# Patient Record
Sex: MALE | Race: ASIAN | NOT HISPANIC OR LATINO
[De-identification: names, ages, dates, MRNs, and addresses within clinical notes are randomized per-mention and may not be internally consistent; named-entity substitution may affect disease eponyms.]

---

## 2022-11-23 PROBLEM — Z00.00 ENCOUNTER FOR PREVENTIVE HEALTH EXAMINATION: Status: ACTIVE | Noted: 2022-11-23

## 2022-12-20 ENCOUNTER — NON-APPOINTMENT (OUTPATIENT)
Age: 57
End: 2022-12-20

## 2022-12-21 ENCOUNTER — APPOINTMENT (OUTPATIENT)
Dept: HEART AND VASCULAR | Facility: CLINIC | Age: 57
End: 2022-12-21
Payer: COMMERCIAL

## 2022-12-21 ENCOUNTER — NON-APPOINTMENT (OUTPATIENT)
Age: 57
End: 2022-12-21

## 2022-12-21 VITALS
TEMPERATURE: 96.5 F | WEIGHT: 225 LBS | HEART RATE: 85 BPM | SYSTOLIC BLOOD PRESSURE: 112 MMHG | HEIGHT: 68 IN | DIASTOLIC BLOOD PRESSURE: 69 MMHG | BODY MASS INDEX: 34.1 KG/M2

## 2022-12-21 DIAGNOSIS — Z78.9 OTHER SPECIFIED HEALTH STATUS: ICD-10-CM

## 2022-12-21 PROCEDURE — 99204 OFFICE O/P NEW MOD 45 MIN: CPT | Mod: 25

## 2022-12-21 PROCEDURE — 93000 ELECTROCARDIOGRAM COMPLETE: CPT

## 2022-12-21 RX ORDER — SIMVASTATIN 20 MG/1
20 TABLET, FILM COATED ORAL
Refills: 0 | Status: ACTIVE | COMMUNITY

## 2022-12-21 RX ORDER — APIXABAN 5 MG/1
5 TABLET, FILM COATED ORAL
Qty: 180 | Refills: 3 | Status: ACTIVE | COMMUNITY
Start: 2022-12-21 | End: 1900-01-01

## 2022-12-21 RX ORDER — AMLODIPINE VALSARTAN AND HYDROCHLOROTHIAZIDE 10; 320; 25 MG/1; MG/1; MG/1
10-320-25 TABLET, FILM COATED ORAL
Refills: 0 | Status: ACTIVE | COMMUNITY

## 2022-12-28 NOTE — ADDENDUM
[FreeTextEntry1] : I, Dominick Ruiz, am scribing for and the presence of Dr. Madera the following sections: HPI, PMH,Family/social history, ROS, Physical Exam, Assessment / Plan.\par

## 2022-12-28 NOTE — HISTORY OF PRESENT ILLNESS
[FreeTextEntry1] : 57 year old male with HTN, HLD and atrial fibrillation, who presents for initial evaluation.\par \par Of note Mr. Tenorio is here with his brother.  He speaks some english but his primary language is [place].  He is here with his brother and Mr. Tenorio refused a phone  and requested that his brother translated.\par \par He states he was first diagnosed with atrial fibrillation about 2 months ago.  He has noted SOB for the past 6-7 months.  States his EKG prior was early 2022 and was in NSR per his recollection. He has palpitations but states that these episodes are better on his current medications.  no syncope, chest pain, SOB at rest, orthopnea or edema.\par \par He is compliant with eliquis\par \par \par TSH WNL\par exho 8/2022- WD 55%. mild MR. LA normal \par holter 8/2022 atrial fibrillation with rates  with avg \par stress EF 55% mild anteroseptal hypokinesis

## 2022-12-28 NOTE — DISCUSSION/SUMMARY
[FreeTextEntry1] : 57 year old male with HTN, HLD and atrial fibrillation, who presents for initial evaluation.  We discussed in detail the normal conduction system of the heart and what atrial fibrillation is.  We discussed the natural progression of this arrhythmia in the context of any existing comorbidities.  We also discussed the association between atrial fibrillation and thrombotic events / stroke.  His CHADS score is at least 1 (HTN) and she is currently on oral anticoagulation.   We discussed lifestyle modifications including weight control, treatment of HTN, and limiting alcohol.  We discussed the association between sleep apnea and if they in fact have sleep apnea the importance of treating this regardless of the treatment plan.\par \par We discussed treatment options for persistent atrial fibrillation including a rate control strategy vs. rhythm control with cardioversion +/- antiarrhythmic medications or an ablation.  The patent was counseled on the fact that there is no cure for atrial fibrillation and that for long term control of atrial fibrillation a combination of strategies if often used.  Regardless of treatment options and maintenance of sinus rhythm, anticoagulation is still recommended based on CHADSVASC score.  \par \par Success in rhythm control management is best defined as improved quality of life, maintenance of sinus rhythm and reduced hospitalization since not all patients have symptoms to diagnose “sub-clinical episodes”.  We discussed that an ablation results in better long-term outcomes compared to medical therapy alone but repeat procedures and/or addition of medications may be required even after an ablation.  \par \par After discussing this the patient would like to proceed with an ablation.  We discussed the procedure in detail including risks, benefits, and drawbacks of each option in detail.   We discussed the procedure in detail including risks, benefits, and alternatives.  I have quoted him a 1:700 chance of major complication related to the procedure.  Risks including, but not limited to; infection, anesthesia reaction, bleeding, pain, vascular injury, cardiac perforation, esophageal injury/fistula,  TE/CVA, phrenic nerve injury, arrhythmia recurrence, need for emergent surgery and death were discussed.  We also discussed that having recurrent arrhythmia for the first 90 days post ablation is not predictive of long-term success of the ablation.  \par \par Mr. Tenorio  appeared to understand the whole discussion and verbalized that all his questions were answered to his satisfaction.  He was given pre procedure instructions and knows to call with any questions or concerns.  \par \par During this visit we reviewed outside medical records including event monitor, echo, MRI, office notes and care plan was discussed with patient / family as well as referring doctor.\par \par  [EKG obtained to assist in diagnosis and management of assessed problem(s)] : EKG obtained to assist in diagnosis and management of assessed problem(s)

## 2022-12-28 NOTE — PHYSICAL EXAM
[Well Developed] : well developed [Well Nourished] : well nourished [No Acute Distress] : no acute distress [Normal Conjunctiva] : normal conjunctiva [5th Left ICS - MCL] : palpated at the 5th LICS in the midclavicular line [Normal Rate] : normal [Irregularly Irregular] : irregularly irregular [Clear Lung Fields] : clear lung fields [Good Air Entry] : good air entry [No Respiratory Distress] : no respiratory distress  [Soft] : abdomen soft [Normal Gait] : normal gait [No Edema] : no edema [Moves all extremities] : moves all extremities [Alert and Oriented] : alert and oriented

## 2022-12-28 NOTE — REVIEW OF SYSTEMS
[Fever] : no fever [Chills] : no chills [Feeling Fatigued] : feeling fatigued [Blurry Vision] : no blurred vision [SOB] : no shortness of breath [Dyspnea on exertion] : dyspnea during exertion [Chest Discomfort] : no chest discomfort [Palpitations] : palpitations [Orthopnea] : no orthopnea [Syncope] : no syncope [Cough] : no cough [Wheezing] : no wheezing [Abdominal Pain] : no abdominal pain [Negative] : Heme/Lymph

## 2023-02-03 ENCOUNTER — INPATIENT (INPATIENT)
Facility: HOSPITAL | Age: 58
LOS: 0 days | Discharge: HOME | DRG: 274 | End: 2023-02-04
Attending: INTERNAL MEDICINE | Admitting: INTERNAL MEDICINE
Payer: COMMERCIAL

## 2023-02-03 VITALS
TEMPERATURE: 97 F | SYSTOLIC BLOOD PRESSURE: 130 MMHG | OXYGEN SATURATION: 98 % | RESPIRATION RATE: 16 BRPM | DIASTOLIC BLOOD PRESSURE: 90 MMHG | HEART RATE: 91 BPM

## 2023-02-03 LAB
ANION GAP SERPL CALC-SCNC: 9 MMOL/L — SIGNIFICANT CHANGE UP (ref 5–17)
APTT BLD: 31.6 SEC — SIGNIFICANT CHANGE UP (ref 27.5–35.5)
BLD GP AB SCN SERPL QL: NEGATIVE — SIGNIFICANT CHANGE UP
BUN SERPL-MCNC: 15 MG/DL — SIGNIFICANT CHANGE UP (ref 7–23)
CALCIUM SERPL-MCNC: 9.7 MG/DL — SIGNIFICANT CHANGE UP (ref 8.4–10.5)
CHLORIDE SERPL-SCNC: 103 MMOL/L — SIGNIFICANT CHANGE UP (ref 96–108)
CO2 SERPL-SCNC: 27 MMOL/L — SIGNIFICANT CHANGE UP (ref 22–31)
CREAT SERPL-MCNC: 1.06 MG/DL — SIGNIFICANT CHANGE UP (ref 0.5–1.3)
EGFR: 81 ML/MIN/1.73M2 — SIGNIFICANT CHANGE UP
GLUCOSE SERPL-MCNC: 88 MG/DL — SIGNIFICANT CHANGE UP (ref 70–99)
HCT VFR BLD CALC: 42.4 % — SIGNIFICANT CHANGE UP (ref 39–50)
HGB BLD-MCNC: 14.8 G/DL — SIGNIFICANT CHANGE UP (ref 13–17)
INR BLD: 1.31 — HIGH (ref 0.88–1.16)
ISTAT INR: 1.4 — HIGH (ref 0.88–1.16)
ISTAT PT: 16.2 SEC — HIGH (ref 10–12.9)
ISTAT VENOUS BE: 2 MMOL/L — SIGNIFICANT CHANGE UP (ref -2–3)
ISTAT VENOUS GLUCOSE: 91 MG/DL — SIGNIFICANT CHANGE UP (ref 70–99)
ISTAT VENOUS HCO3: 28 MMOL/L — SIGNIFICANT CHANGE UP (ref 23–28)
ISTAT VENOUS HEMATOCRIT: 43 % — SIGNIFICANT CHANGE UP (ref 39–50)
ISTAT VENOUS HEMOGLOBIN: 14.6 GM/DL — SIGNIFICANT CHANGE UP (ref 13–17)
ISTAT VENOUS IONIZED CALCIUM: 1.22 MMOL/L — SIGNIFICANT CHANGE UP (ref 1.12–1.3)
ISTAT VENOUS PCO2: 49 MMHG — SIGNIFICANT CHANGE UP (ref 41–51)
ISTAT VENOUS PH: 7.37 — SIGNIFICANT CHANGE UP (ref 7.31–7.41)
ISTAT VENOUS PO2: <66 MMHG — LOW (ref 35–40)
ISTAT VENOUS POTASSIUM: 4.5 MMOL/L — SIGNIFICANT CHANGE UP (ref 3.5–5.3)
ISTAT VENOUS SO2: 40 % — SIGNIFICANT CHANGE UP
ISTAT VENOUS SODIUM: 140 MMOL/L — SIGNIFICANT CHANGE UP (ref 135–145)
ISTAT VENOUS TCO2: 29 MMOL/L — SIGNIFICANT CHANGE UP (ref 22–31)
MCHC RBC-ENTMCNC: 32 PG — SIGNIFICANT CHANGE UP (ref 27–34)
MCHC RBC-ENTMCNC: 34.9 GM/DL — SIGNIFICANT CHANGE UP (ref 32–36)
MCV RBC AUTO: 91.6 FL — SIGNIFICANT CHANGE UP (ref 80–100)
NRBC # BLD: 0 /100 WBCS — SIGNIFICANT CHANGE UP (ref 0–0)
PLATELET # BLD AUTO: 216 K/UL — SIGNIFICANT CHANGE UP (ref 150–400)
POCT ISTAT CREATININE: 1.1 MG/DL — SIGNIFICANT CHANGE UP (ref 0.5–1.3)
POTASSIUM SERPL-MCNC: 4.4 MMOL/L — SIGNIFICANT CHANGE UP (ref 3.5–5.3)
POTASSIUM SERPL-SCNC: 4.4 MMOL/L — SIGNIFICANT CHANGE UP (ref 3.5–5.3)
PROTHROM AB SERPL-ACNC: 15.6 SEC — HIGH (ref 10.5–13.4)
RBC # BLD: 4.63 M/UL — SIGNIFICANT CHANGE UP (ref 4.2–5.8)
RBC # FLD: 11.5 % — SIGNIFICANT CHANGE UP (ref 10.3–14.5)
RH IG SCN BLD-IMP: POSITIVE — SIGNIFICANT CHANGE UP
RH IG SCN BLD-IMP: POSITIVE — SIGNIFICANT CHANGE UP
SODIUM SERPL-SCNC: 139 MMOL/L — SIGNIFICANT CHANGE UP (ref 135–145)
WBC # BLD: 8.57 K/UL — SIGNIFICANT CHANGE UP (ref 3.8–10.5)
WBC # FLD AUTO: 8.57 K/UL — SIGNIFICANT CHANGE UP (ref 3.8–10.5)

## 2023-02-03 PROCEDURE — 93656 COMPRE EP EVAL ABLTJ ATR FIB: CPT

## 2023-02-03 RX ORDER — FUROSEMIDE 40 MG
20 TABLET ORAL DAILY
Refills: 0 | Status: DISCONTINUED | OUTPATIENT
Start: 2023-02-04 | End: 2023-02-04

## 2023-02-03 RX ORDER — FLECAINIDE ACETATE 50 MG
100 TABLET ORAL
Refills: 0 | Status: DISCONTINUED | OUTPATIENT
Start: 2023-02-03 | End: 2023-02-04

## 2023-02-03 RX ORDER — SIMVASTATIN 20 MG/1
20 TABLET, FILM COATED ORAL AT BEDTIME
Refills: 0 | Status: DISCONTINUED | OUTPATIENT
Start: 2023-02-03 | End: 2023-02-04

## 2023-02-03 RX ORDER — AMLODIPINE BESYLATE 2.5 MG/1
10 TABLET ORAL DAILY
Refills: 0 | Status: DISCONTINUED | OUTPATIENT
Start: 2023-02-03 | End: 2023-02-04

## 2023-02-03 RX ORDER — METOPROLOL TARTRATE 50 MG
200 TABLET ORAL DAILY
Refills: 0 | Status: DISCONTINUED | OUTPATIENT
Start: 2023-02-03 | End: 2023-02-04

## 2023-02-03 RX ORDER — PANTOPRAZOLE SODIUM 20 MG/1
40 TABLET, DELAYED RELEASE ORAL
Refills: 0 | Status: DISCONTINUED | OUTPATIENT
Start: 2023-02-03 | End: 2023-02-04

## 2023-02-03 RX ORDER — APIXABAN 2.5 MG/1
5 TABLET, FILM COATED ORAL
Refills: 0 | Status: DISCONTINUED | OUTPATIENT
Start: 2023-02-03 | End: 2023-02-04

## 2023-02-03 RX ORDER — VALSARTAN 80 MG/1
320 TABLET ORAL DAILY
Refills: 0 | Status: DISCONTINUED | OUTPATIENT
Start: 2023-02-03 | End: 2023-02-04

## 2023-02-03 RX ADMIN — SIMVASTATIN 20 MILLIGRAM(S): 20 TABLET, FILM COATED ORAL at 22:03

## 2023-02-03 RX ADMIN — APIXABAN 5 MILLIGRAM(S): 2.5 TABLET, FILM COATED ORAL at 22:03

## 2023-02-03 RX ADMIN — Medication 100 MILLIGRAM(S): at 22:03

## 2023-02-03 NOTE — H&P ADULT - ASSESSMENT
57 year old male with HTN, HLD and atrial fibrillation (on eliquis) here for elective AFIB ablation. Normal LVEF on Echo and stress test.    - NPO for AFIB PVI ablation  - Bedrest post procedure per protocol  - Continue A/C post procedure (6 hours post procedure)   - Continue home meds.

## 2023-02-03 NOTE — H&P ADULT - HISTORY OF PRESENT ILLNESS
57 year old male with HTN, HLD and atrial fibrillation (on eliquis) here for elective AFIB ablation.     He states he was first diagnosed with atrial fibrillation about 4 months ago.  He has noted SOB for the past 6-7 months.  States his EKG prior was early 2022 and was in NSR per his recollection. He has palpitations but states that these episodes are better on his current medications.  no syncope, chest pain, SOB at rest, orthopnea or edema.  He is compliant with eliquis.   TSH WNL  echo 8/2022- WD 55%. mild MR. LA normal   holter 8/2022 atrial fibrillation with rates  with avg   stress EF 55% mild anteroseptal hypokinesis

## 2023-02-03 NOTE — PRE-ANESTHESIA EVALUATION ADULT - NSANTHOSAYNRD_GEN_A_CORE
No. EMY screening performed.  STOP BANG Legend: 0-2 = LOW Risk; 3-4 = INTERMEDIATE Risk; 5-8 = HIGH Risk

## 2023-02-03 NOTE — CHART NOTE - NSCHARTNOTEFT_GEN_A_CORE
BRIEF PROCEDURE NOTE    NINO ELLISON  [id number]    Pre-op Diagnosis: Atrial fibrillation    Post-op Diagnosis: Same    Procedure: Atrial Fibrillation Ablation    Electrophysiologist: Dean Madera MD    Assistant: Bob Payne MD - fellow    Anesthesia: General Anesthesia    Access: R and L Femoral veins    Description:  7Fr sheath LFV: decapolar catheter in CS  11Fr sheath LFV: ICE  8Fr sheath RFV: Transeptal, Mapping/ablation    ICE guided transeptal  Mapping/3D shell created of left atrium and PVs  esophogeal temp monitoring throughout  PVI, isolation confirmed with pacing, adenosine with evidence of increased PV activity with confirmed exit block        Complications: none    EBL: 20cc    Disposition: to recovery, overnight observation    Plan:  -resume Eliquis tonight  -Lasix 20mg daily for 5 days  -colchicine 0.6mg daily for 7 days  -pantoprazole 40mg daily for 1 month  -start flecainide 100mg bid, overnight telemetry monitoring  -resume home Toprol XL  -bedrest 3 hours ~(10pm)    Bob Payne MD  EP Fellow

## 2023-02-04 ENCOUNTER — TRANSCRIPTION ENCOUNTER (OUTPATIENT)
Age: 58
End: 2023-02-04

## 2023-02-04 VITALS — TEMPERATURE: 98 F

## 2023-02-04 DIAGNOSIS — E78.5 HYPERLIPIDEMIA, UNSPECIFIED: ICD-10-CM

## 2023-02-04 PROCEDURE — C1759: CPT

## 2023-02-04 PROCEDURE — 82803 BLOOD GASES ANY COMBINATION: CPT

## 2023-02-04 PROCEDURE — 82947 ASSAY GLUCOSE BLOOD QUANT: CPT

## 2023-02-04 PROCEDURE — C1730: CPT

## 2023-02-04 PROCEDURE — 82330 ASSAY OF CALCIUM: CPT

## 2023-02-04 PROCEDURE — 86850 RBC ANTIBODY SCREEN: CPT

## 2023-02-04 PROCEDURE — C1766: CPT

## 2023-02-04 PROCEDURE — 80048 BASIC METABOLIC PNL TOTAL CA: CPT

## 2023-02-04 PROCEDURE — 85027 COMPLETE CBC AUTOMATED: CPT

## 2023-02-04 PROCEDURE — 99238 HOSP IP/OBS DSCHRG MGMT 30/<: CPT

## 2023-02-04 PROCEDURE — 85347 COAGULATION TIME ACTIVATED: CPT

## 2023-02-04 PROCEDURE — C1732: CPT

## 2023-02-04 PROCEDURE — C1760: CPT

## 2023-02-04 PROCEDURE — 36415 COLL VENOUS BLD VENIPUNCTURE: CPT

## 2023-02-04 PROCEDURE — 85730 THROMBOPLASTIN TIME PARTIAL: CPT

## 2023-02-04 PROCEDURE — 84295 ASSAY OF SERUM SODIUM: CPT

## 2023-02-04 PROCEDURE — C1894: CPT

## 2023-02-04 PROCEDURE — 86900 BLOOD TYPING SEROLOGIC ABO: CPT

## 2023-02-04 PROCEDURE — 86901 BLOOD TYPING SEROLOGIC RH(D): CPT

## 2023-02-04 PROCEDURE — 85014 HEMATOCRIT: CPT

## 2023-02-04 PROCEDURE — 84132 ASSAY OF SERUM POTASSIUM: CPT

## 2023-02-04 PROCEDURE — 85610 PROTHROMBIN TIME: CPT

## 2023-02-04 PROCEDURE — 82565 ASSAY OF CREATININE: CPT

## 2023-02-04 RX ORDER — APIXABAN 2.5 MG/1
1 TABLET, FILM COATED ORAL
Qty: 0 | Refills: 0 | DISCHARGE

## 2023-02-04 RX ORDER — APIXABAN 2.5 MG/1
1 TABLET, FILM COATED ORAL
Qty: 60 | Refills: 3
Start: 2023-02-04 | End: 2023-06-03

## 2023-02-04 RX ORDER — PANTOPRAZOLE SODIUM 20 MG/1
1 TABLET, DELAYED RELEASE ORAL
Qty: 30 | Refills: 0
Start: 2023-02-04 | End: 2023-03-05

## 2023-02-04 RX ORDER — FLECAINIDE ACETATE 50 MG
1 TABLET ORAL
Qty: 60 | Refills: 3
Start: 2023-02-04 | End: 2023-06-03

## 2023-02-04 RX ORDER — METOPROLOL TARTRATE 50 MG
1 TABLET ORAL
Qty: 30 | Refills: 3
Start: 2023-02-04 | End: 2023-06-03

## 2023-02-04 RX ORDER — SIMVASTATIN 20 MG/1
1 TABLET, FILM COATED ORAL
Qty: 30 | Refills: 3
Start: 2023-02-04 | End: 2023-06-03

## 2023-02-04 RX ORDER — SIMVASTATIN 20 MG/1
1 TABLET, FILM COATED ORAL
Qty: 0 | Refills: 0 | DISCHARGE

## 2023-02-04 RX ORDER — METOPROLOL TARTRATE 50 MG
1 TABLET ORAL
Qty: 0 | Refills: 0 | DISCHARGE

## 2023-02-04 RX ORDER — FUROSEMIDE 40 MG
1 TABLET ORAL
Qty: 4 | Refills: 0
Start: 2023-02-04 | End: 2023-02-07

## 2023-02-04 RX ORDER — AMLODIPINE VALSARTAN AND HYDROCHLOROTHIAZIDE 10; 160; 25 MG/1; MG/1; MG/1
1 TABLET, FILM COATED ORAL
Qty: 30 | Refills: 3
Start: 2023-02-04 | End: 2023-06-03

## 2023-02-04 RX ORDER — AMLODIPINE VALSARTAN AND HYDROCHLOROTHIAZIDE 10; 160; 25 MG/1; MG/1; MG/1
1 TABLET, FILM COATED ORAL
Qty: 0 | Refills: 0 | DISCHARGE

## 2023-02-04 RX ADMIN — AMLODIPINE BESYLATE 10 MILLIGRAM(S): 2.5 TABLET ORAL at 06:58

## 2023-02-04 RX ADMIN — VALSARTAN 320 MILLIGRAM(S): 80 TABLET ORAL at 06:58

## 2023-02-04 RX ADMIN — Medication 100 MILLIGRAM(S): at 09:53

## 2023-02-04 RX ADMIN — APIXABAN 5 MILLIGRAM(S): 2.5 TABLET, FILM COATED ORAL at 06:58

## 2023-02-04 RX ADMIN — PANTOPRAZOLE SODIUM 40 MILLIGRAM(S): 20 TABLET, DELAYED RELEASE ORAL at 06:58

## 2023-02-04 RX ADMIN — Medication 20 MILLIGRAM(S): at 07:34

## 2023-02-04 RX ADMIN — Medication 200 MILLIGRAM(S): at 06:59

## 2023-02-04 NOTE — PROGRESS NOTE ADULT - ASSESSMENT
57 year old male with HTN, HLD and atrial fibrillation who underwent AF ablation yesterday.    -ok for discharge from EP standpoint  -activity restrictions reviewed  -continue eliquis and metoprolol.  flecainide 100mg bid for one month.  -furosemide for 4 more days.  -outpatient follow-up with Dr. Madera.

## 2023-02-04 NOTE — DISCHARGE NOTE NURSING/CASE MANAGEMENT/SOCIAL WORK - NURSING SECTION COMPLETE
Patient/Caregiver provided printed discharge information. V-Y Flap Text: The defect edges were debeveled with a #15 scalpel blade.  Given the location of the defect, shape of the defect and the proximity to free margins a V-Y flap was deemed most appropriate.  Using a sterile surgical marker, an appropriate advancement flap was drawn incorporating the defect and placing the expected incisions within the relaxed skin tension lines where possible.    The area thus outlined was incised deep to adipose tissue with a #15 scalpel blade.  The skin margins were undermined to an appropriate distance in all directions utilizing iris scissors.

## 2023-02-04 NOTE — DISCHARGE NOTE PROVIDER - NSDCCPCAREPLAN_GEN_ALL_CORE_FT
PRINCIPAL DISCHARGE DIAGNOSIS  Diagnosis: Other persistent atrial fibrillation  Assessment and Plan of Treatment: You have a history Atrial Fbrillation (a type of irregular heartbeat)and underwent an Ablation procedure on 2/3/23  --PLEASE CONTINUE YOUR HOME ELIQUIS 5MG TWICE DAILY AS PRESCRIBED (NOT NEW)  --PLEASE CONTINUE YOUR HOME METORPOLOL SUCCINATE 200MG DAILY (NOT NEW)  --YOU WERE ALSO STARTED ON FLECAINIDE 100MG, EVERY 12 HOURS FOR RHYTHM CONTROL (NEW)  --PLEASE TAKE LASIX 20MG DAILY FOR 4 DAYS TO PREVENT FLUID BUILD UP (NEW)  --PLEASE TAKE PANTOPRAZOLE 40MG DAILY FOR ONE MONTH FOR STOMACH PROTECTION  --Please follow up with Dr Madera in 2 weeks for post discharge check-up.          SECONDARY DISCHARGE DIAGNOSES  Diagnosis: HTN (hypertension)  Assessment and Plan of Treatment: You have a history of elevated blood pressure and you should continue your blood pressure medications as prescribed.  --PLEASE CONTINUE YOUR HOME EXFORGE HCT 10/320/25MG DAILY (NOT NEW)       PRINCIPAL DISCHARGE DIAGNOSIS  Diagnosis: Other persistent atrial fibrillation  Assessment and Plan of Treatment: You have a history Atrial Fbrillation (a type of irregular heartbeat)and underwent an Ablation procedure on 2/3/23  --PLEASE CONTINUE YOUR HOME ELIQUIS 5MG TWICE DAILY AS PRESCRIBED (NOT NEW)  --PLEASE CONTINUE YOUR HOME METORPOLOL SUCCINATE 200MG DAILY (NOT NEW)  --YOU WERE ALSO STARTED ON FLECAINIDE 100MG, EVERY 12 HOURS FOR RHYTHM CONTROL (NEW)  --PLEASE TAKE LASIX 20MG DAILY FOR 4 DAYS TO PREVENT FLUID BUILD UP (NEW)  --PLEASE TAKE PANTOPRAZOLE 40MG DAILY FOR ONE MONTH FOR STOMACH PROTECTION  --Please follow up with Dr Madera in 2 weeks for post discharge check-up.          SECONDARY DISCHARGE DIAGNOSES  Diagnosis: HTN (hypertension)  Assessment and Plan of Treatment: You have a history of elevated blood pressure and you should continue your blood pressure medications as prescribed.  --PLEASE CONTINUE YOUR HOME EXFORGE HCT 10/320/25MG DAILY (NOT NEW)  --C/W METOPROLOL SUCCINATE 20MG DAILY(NOT NEW)

## 2023-02-04 NOTE — DISCHARGE NOTE PROVIDER - CARE PROVIDER_API CALL
Dean Madera)  Cardiac Electrophysiology  [address], [address] [place] [address]  Phone: [phone]  Fax: [phone]  Follow Up Time:

## 2023-02-04 NOTE — DISCHARGE NOTE NURSING/CASE MANAGEMENT/SOCIAL WORK - PATIENT PORTAL LINK FT
You can access the FollowMyHealth Patient Portal offered by [facility] by registering at the following website: [email or web address]. By joining [facility]’s FollowMyHealth portal, you will also be able to view your health information using other applications (apps) compatible with our system.

## 2023-02-04 NOTE — PATIENT PROFILE ADULT - FALL HARM RISK - HARM RISK INTERVENTIONS
Assistance with ambulation/Assistance OOB with selected safe patient handling equipment/Communicate Risk of Fall with Harm to all staff/Monitor gait and stability/Reinforce activity limits and safety measures with patient and family/Review medications for side effects contributing to fall risk/Sit up slowly, dangle for a short time, stand at bedside before walking/Tailored Fall Risk Interventions/Toileting schedule using arm’s reach rule for commode and bathroom/Use of alarms - bed, chair and/or voice tab/Visual Cue: Yellow wristband and red socks/Bed in lowest position, wheels locked, appropriate side rails in place/Call bell, personal items and telephone in reach/Instruct patient to call for assistance before getting out of bed or chair/Non-slip footwear when patient is out of bed/[place] to call system/Physically safe environment - no spills, clutter or unnecessary equipment/Purposeful Proactive Rounding/Room/bathroom lighting operational, light cord in reach

## 2023-02-04 NOTE — DISCHARGE NOTE PROVIDER - HOSPITAL COURSE
57 year old male [place] / english with HTN, HLD and atrial fibrillation (on eliquis) here for elective AFIB ablation.     He states he was first diagnosed with atrial fibrillation about 4 months ago.  He has noted SOB for the past 6-7 months.  States his EKG prior was early 2022 and was in NSR per his recollection. He has palpitations but states that these episodes are better on his current medications.  no syncope, chest pain, SOB at rest, orthopnea or edema.  He is compliant with eliquis.   TSH WNL  echo 8/2022- WD 55%. mild MR. LA normal   holter 8/2022 atrial fibrillation with rates  with avg   stress EF 55% mild anteroseptal hypokinesis    - s/p AFIB ablation (B/L 2 L CFV, 1 R CFV manually pulled)  -Start Flecainide 100mg BID, EKG 2h post dose  -Lasix 20mg PO daily x 5 days to start in AM  -Pantoprazole 40mg PO QAM x 1 month  -Eliquis to be resumed tonight 2/3 PM.    2/3 O/N: EKG stable, .   57 year old male [place] / english with HTN, HLD and atrial fibrillation (on eliquis) here for elective AFIB ablation.  He states he was first diagnosed with atrial fibrillation about 4 months ago, noted with SOB for the past 6-7 months.  States his EKG prior was early 2022 and was in NSR per his recollection. Pt with palpitations but states that these episodes are better on his current medications.  no syncope, chest pain, SOB at rest, orthopnea or edema.  He is compliant with Eliquis.   TSH WNL.  ECHO 8/2022- WD 55%. mild MR. LA normal.  Holter 8/2022 atrial fibrillation with rates  with avg .  NST EF 55% mild anteroseptal hypokinesis    Pt is s/p AFIB ablation (B/L 2 L CFV, 1 R CFV manually pulled). He was started on Flecainide 100mg BID, EKG 2h post dose, -Lasix 20mg PO daily x 5 days to start on 2/4/23 in AM, Pantoprazole 40mg PO QAM x 1 month  Pt home Eliquis was resumed 2/3/23 in evening post procedure.  EKG stable,  overnight on 2/3/23    Pt seen at bedside today, examined found NAD, HD stable, denies any complaints of CP, SOB or palpitations at this time.  VSS.  EKG  NSR @ *** QTc **********.  Tele Monitor reviewed.  Pt is deemed stable for discharge per Dr Schwartz with follow up in 1-2 weeks with NESSA  for post discharge check-up.  Rx sent to pt preferred pharmacy.  Discharge plans and instruction discussed with pt who is agreeable with plan.  Pt is advised to return to the nearest ED if worsening symptoms of CP, SOB or palpitations or severe bleeding from access site occurs.   57 year old male [place] / english with HTN, HLD and atrial fibrillation (on eliquis) here for elective AFIB ablation.  He states he was first diagnosed with atrial fibrillation about 4 months ago, noted with SOB for the past 6-7 months.  States his EKG prior was early 2022 and was in NSR per his recollection. Pt with palpitations but states that these episodes are better on his current medications.  no syncope, chest pain, SOB at rest, orthopnea or edema.  He is compliant with Eliquis.   TSH WNL.  ECHO 8/2022- WD 55%. mild MR. LA normal.  Holter 8/2022 atrial fibrillation with rates  with avg .  NST EF 55% mild anteroseptal hypokinesis    Pt is s/p AFIB ablation (B/L 2 L CFV, 1 R CFV manually pulled). He was started on Flecainide 100mg BID,  Lasix 20mg PO daily x 5 days to start on 2/4/23 in AM, Pantoprazole 40mg PO QAM x 1 month  Pt home Eliquis was resumed 2/3/23 in evening post procedure.  EKG stable,  overnight on 2/3/23    Pt seen at bedside today, examined found NAD, HD stable, denies any complaints of CP, SOB or palpitations at this time.  VSS.  EKG  2/4/23:  NSR @ 67bpm, QTc 443.  Tele Monitor reviewed.  Pt is deemed stable for discharge per Dr Schwartz with follow up in 2 weeks with Dr Madera  for post discharge check-up.  Rx sent to pt preferred pharmacy.  Discharge plans and instruction discussed with pt who is agreeable with plan.  Pt is advised to return to the nearest ED if worsening symptoms of CP, SOB or palpitations or severe bleeding from access site occurs.   2.12

## 2023-02-04 NOTE — DISCHARGE NOTE PROVIDER - NSDCMRMEDTOKEN_GEN_ALL_CORE_FT
Eliquis 5 mg oral tablet: 1 tab(s) orally 2 times a day  Exforge HCT 10 mg-320 mg-25 mg oral tablet: 1 tab(s) orally once a day  Toprol- mg oral tablet, extended release: 1 tab(s) orally once a day  Zocor 20 mg oral tablet: 1 tab(s) orally once a day (at bedtime)   Eliquis 5 mg oral tablet: 1 tab(s) orally 2 times a day  Exforge HCT 10 mg-320 mg-25 mg oral tablet: 1 tab(s) orally once a day  flecainide 100 mg oral tablet: 1 tab(s) orally 2 times a day  furosemide 20 mg oral tablet: 1 tab(s) orally once a day  pantoprazole 40 mg oral delayed release tablet: 1 tab(s) orally once a day (before a meal)  Toprol- mg oral tablet, extended release: 1 tab(s) orally once a day  Zocor 20 mg oral tablet: 1 tab(s) orally once a day (at bedtime)

## 2023-02-04 NOTE — DISCHARGE NOTE PROVIDER - PROVIDER TOKENS
Bedside and Verbal shift change report given to BUZZ Selby RN (oncoming nurse) by Nidia Koyanagi RN (offgoing nurse). Report included the following information SBAR, Kardex, Intake/Output, MAR and Recent Results. PROVIDER:[TOKEN:[[id number]]]

## 2023-02-04 NOTE — PROGRESS NOTE ADULT - SUBJECTIVE AND OBJECTIVE BOX
No complaints.  Reports he has been urinating a lot.  Telemetry shows sinus rhythm 60s to 70s    MEDICATIONS  (STANDING):  amLODIPine   Tablet 10 milliGRAM(s) Oral daily  apixaban 5 milliGRAM(s) Oral two times a day  flecainide 100 milliGRAM(s) Oral two times a day  furosemide    Tablet 20 milliGRAM(s) Oral daily  hydrochlorothiazide 25 milliGRAM(s) Oral daily  metoprolol succinate  milliGRAM(s) Oral daily  pantoprazole    Tablet 40 milliGRAM(s) Oral before breakfast  simvastatin 20 milliGRAM(s) Oral at bedtime  valsartan 320 milliGRAM(s) Oral daily      PHYSICAL EXAM:  VITAL SIGNS:  T(F): 97.2 (02-04-23 @ 09:45)  HR: 76 (02-04-23 @ 08:29)  BP: 127/76 (02-04-23 @ 08:29)  RR: 18 (02-04-23 @ 08:29)  SpO2: 97% (02-04-23 @ 08:29)    -nad, alert  -no carotid bruit  -regular, no murmurs  -lungs clear bilaterally  -abd soft, nt  -no sig edema.  Femoral access sites c/d/i, no hematoma.  -alert and conversant  -normal mood and affect  -no rash noted  -moves all extremities      LABS:                        14.8   8.57  )-----------( 216      ( 03 Feb 2023 13:46 )             42.4     02-03    139  |  103  |  15  ----------------------------<  88  4.4   |  27  |  1.06    Ca    9.7      03 Feb 2023 13:46      PT/INR - ( 03 Feb 2023 13:46 )   PT: 15.6 sec;   INR: 1.31          PTT - ( 03 Feb 2023 13:46 )  PTT:31.6 sec

## 2023-02-06 PROBLEM — I10 ESSENTIAL (PRIMARY) HYPERTENSION: Chronic | Status: ACTIVE | Noted: 2023-02-03

## 2023-02-06 PROBLEM — I48.0 PAROXYSMAL ATRIAL FIBRILLATION: Chronic | Status: ACTIVE | Noted: 2023-02-03

## 2023-02-06 PROBLEM — E66.9 OBESITY, UNSPECIFIED: Chronic | Status: ACTIVE | Noted: 2023-02-03

## 2023-02-07 LAB
ISTAT ACTK (ACTIVATED CLOTTING TIME KAOLIN): 317 SEC — HIGH (ref 74–137)
ISTAT ACTK (ACTIVATED CLOTTING TIME KAOLIN): 341 SEC — HIGH (ref 74–137)
ISTAT ACTK (ACTIVATED CLOTTING TIME KAOLIN): 348 SEC — HIGH (ref 74–137)
ISTAT ACTK (ACTIVATED CLOTTING TIME KAOLIN): 377 SEC — HIGH (ref 74–137)

## 2023-02-14 DIAGNOSIS — I48.19 OTHER PERSISTENT ATRIAL FIBRILLATION: ICD-10-CM

## 2023-02-14 DIAGNOSIS — I10 ESSENTIAL (PRIMARY) HYPERTENSION: ICD-10-CM

## 2023-02-14 DIAGNOSIS — E78.5 HYPERLIPIDEMIA, UNSPECIFIED: ICD-10-CM

## 2023-02-14 DIAGNOSIS — Z79.01 LONG TERM (CURRENT) USE OF ANTICOAGULANTS: ICD-10-CM

## 2023-02-14 DIAGNOSIS — E66.9 OBESITY, UNSPECIFIED: ICD-10-CM

## 2023-02-22 ENCOUNTER — NON-APPOINTMENT (OUTPATIENT)
Age: 58
End: 2023-02-22

## 2023-02-22 ENCOUNTER — APPOINTMENT (OUTPATIENT)
Dept: HEART AND VASCULAR | Facility: CLINIC | Age: 58
End: 2023-02-22
Payer: COMMERCIAL

## 2023-02-22 VITALS
HEART RATE: 68 BPM | HEIGHT: 68 IN | SYSTOLIC BLOOD PRESSURE: 148 MMHG | BODY MASS INDEX: 34.1 KG/M2 | WEIGHT: 225 LBS | DIASTOLIC BLOOD PRESSURE: 69 MMHG

## 2023-02-22 DIAGNOSIS — I48.19 OTHER PERSISTENT ATRIAL FIBRILLATION: ICD-10-CM

## 2023-02-22 PROCEDURE — 93000 ELECTROCARDIOGRAM COMPLETE: CPT

## 2023-02-22 PROCEDURE — 99214 OFFICE O/P EST MOD 30 MIN: CPT | Mod: 25

## 2023-02-22 NOTE — PHYSICAL EXAM
[Well Developed] : well developed [Well Nourished] : well nourished [No Acute Distress] : no acute distress [Normal Conjunctiva] : normal conjunctiva [5th Left ICS - MCL] : palpated at the 5th LICS in the midclavicular line [Normal Rate] : normal [Irregularly Irregular] : irregularly irregular [Clear Lung Fields] : clear lung fields [Good Air Entry] : good air entry [No Respiratory Distress] : no respiratory distress  [Soft] : abdomen soft [Normal Gait] : normal gait [No Edema] : no edema [Moves all extremities] : moves all extremities [Alert and Oriented] : alert and oriented [Normal Venous Pressure] : normal venous pressure [No Carotid Bruit] : no carotid bruit [Normal S1, S2] : normal S1, S2 [No Murmur] : no murmur [No Rub] : no rub [No Gallop] : no gallop [Non Tender] : non-tender [No Masses/organomegaly] : no masses/organomegaly [Normal Bowel Sounds] : normal bowel sounds [No Cyanosis] : no cyanosis [No Clubbing] : no clubbing [No Varicosities] : no varicosities [No Rash] : no rash [No Skin Lesions] : no skin lesions [No Focal Deficits] : no focal deficits [Normal Speech] : normal speech [Normal memory] : normal memory

## 2023-02-28 NOTE — HISTORY OF PRESENT ILLNESS
[FreeTextEntry1] : 57 year old male with HTN, HLD and atrial fibrillation, who presents for followup evaluation s/p AF ablation 2/3/23. \par \par Of note Mr. Tenorio is usually accompanied by his brother.  He speaks some english but his primary language is [place]. He states he was first diagnosed with atrial fibrillation about 2 months ago.  He has noted SOB for the past 6-7 months.  States his EKG prior was early 2022 and was in NSR per his recollection. \par \par \par Patient states that overall he feels marked improvement. No further episodes of palpitations, no syncope, chest pain, SOB at rest, orthopnea or edema. Patient reports no issues post ablation with access site or with swallowing. \par \par He is compliant with eliquis. He presents today in NSR. \par \par \par TSH WNL\par exho 8/2022- WD 55%. mild MR. LA normal \par holter 8/2022 atrial fibrillation with rates  with avg \par stress EF 55% mild anteroseptal hypokinesis

## 2023-02-28 NOTE — REVIEW OF SYSTEMS
[SOB] : shortness of breath [Negative] : Musculoskeletal [Fever] : no fever [Chills] : no chills [Feeling Fatigued] : not feeling fatigued [Blurry Vision] : no blurred vision [Dyspnea on exertion] : not dyspnea during exertion [Chest Discomfort] : no chest discomfort [Palpitations] : no palpitations [Orthopnea] : no orthopnea [Syncope] : no syncope [Cough] : no cough [Wheezing] : no wheezing [Abdominal Pain] : no abdominal pain

## 2023-02-28 NOTE — ADDENDUM
[FreeTextEntry1] : I, Teresa Mcdaniel, am scribing for and the presence of Dr. Madera the following sections HISTORY OF PRESENT ILLNESSS, CARDIOLOGY SUMMARY, ACTIVE PROBLEMS, PAST MEDICAL/FAMILY/SOCIAL HISTORY; REVIEW OF SYSTEMS; VITAL SIGNS; PHYSICAL EXAM, PROCEDURE, DISCUSSION\par  IDean, personally performed the services described in the documentation, reviewed the documentation recorded by the scribe in my presence and it accurately and completely records my words and actions.\par

## 2023-02-28 NOTE — DISCUSSION/SUMMARY
[EKG obtained to assist in diagnosis and management of assessed problem(s)] : EKG obtained to assist in diagnosis and management of assessed problem(s) [FreeTextEntry1] : 57 year old male with HTN, HLD and atrial fibrillation, who presents for followup evaluation s/p AF ablation 2/3/23. \par \par #Atrial Fibrillation\par No further symptoms\par Patient is in NSR \par No problems with completing normal activity \par No post-procedure complications\par Patient is compliant with anticoagulation \par \par \par #Shortness of breath\par Reports dyspnea on exertion\par Will order lasix to help reduce fluid volume overload\par \par #EMY\par Recommend sleep study evaluation \par Will followup when brother is back in 3 weeks\par \par #Lifestyle modification\par Discussed lifestyle modifications including weight control, treatment of HTN, and limiting alcohol. \par \par Patient will return to office in 2 months, he knows to call with any questions or concerns.

## 2023-04-03 ENCOUNTER — OUTPATIENT (OUTPATIENT)
Dept: OUTPATIENT SERVICES | Facility: HOSPITAL | Age: 58
LOS: 1 days | End: 2023-04-03
Payer: COMMERCIAL

## 2023-04-03 ENCOUNTER — APPOINTMENT (OUTPATIENT)
Dept: SLEEP CENTER | Facility: HOME HEALTH | Age: 58
End: 2023-04-03
Payer: COMMERCIAL

## 2023-04-03 PROCEDURE — 95800 SLP STDY UNATTENDED: CPT

## 2023-04-03 PROCEDURE — 95800 SLP STDY UNATTENDED: CPT | Mod: 26

## 2023-04-04 DIAGNOSIS — G47.33 OBSTRUCTIVE SLEEP APNEA (ADULT) (PEDIATRIC): ICD-10-CM

## 2023-04-19 ENCOUNTER — APPOINTMENT (OUTPATIENT)
Dept: HEART AND VASCULAR | Facility: CLINIC | Age: 58
End: 2023-04-19
Payer: COMMERCIAL

## 2023-04-19 ENCOUNTER — NON-APPOINTMENT (OUTPATIENT)
Age: 58
End: 2023-04-19

## 2023-04-19 VITALS
HEART RATE: 63 BPM | HEIGHT: 68 IN | DIASTOLIC BLOOD PRESSURE: 72 MMHG | BODY MASS INDEX: 35.77 KG/M2 | SYSTOLIC BLOOD PRESSURE: 121 MMHG | WEIGHT: 236 LBS

## 2023-04-19 PROCEDURE — 99214 OFFICE O/P EST MOD 30 MIN: CPT | Mod: 25

## 2023-04-19 PROCEDURE — 93000 ELECTROCARDIOGRAM COMPLETE: CPT

## 2023-04-19 RX ORDER — FUROSEMIDE 20 MG/1
20 TABLET ORAL DAILY
Qty: 7 | Refills: 0 | Status: DISCONTINUED | COMMUNITY
Start: 2023-02-22 | End: 2023-04-19

## 2023-04-20 NOTE — DISCUSSION/SUMMARY
[EKG obtained to assist in diagnosis and management of assessed problem(s)] : EKG obtained to assist in diagnosis and management of assessed problem(s) [FreeTextEntry1] : 57 year old male with HTN, HLD and atrial fibrillation, who presents for followup evaluation s/p AF ablation 2/3/23. \par \par #Atrial Fibrillation\par No further symptoms concerning for recurrent arrhythmia\par Patient is in NSR \par Some exertional fatigue.  Will stop Flecainide and half metoprolol to 100mg a day\par No post-procedure complications\par Patient is compliant with anticoagulation \par \par \par \par #EMY\par He was given the number to contact the sleep center to follow up regarding treatment.\par \par #Lifestyle modification\par Discussed lifestyle modifications including weight control, treatment of HTN, and limiting alcohol. \par \par Patient will return to office in 6 months, he knows to call with any questions or concerns.

## 2023-04-20 NOTE — ADDENDUM
[FreeTextEntry1] :  \par  I, Dean Madera, personally performed the services described in the documentation, reviewed the documentation recorded by the scribe in my presence and it accurately and completely records my words and actions.\par I, Dominick Ruiz, am scribing for and the presence of Dr. Madera the following sections: HPI, PMH,Family/social history, ROS, Physical Exam, Assessment / Plan.\par

## 2023-04-20 NOTE — REVIEW OF SYSTEMS
[Negative] : Heme/Lymph [Feeling Fatigued] : feeling fatigued [Fever] : no fever [Chills] : no chills [Blurry Vision] : no blurred vision [SOB] : no shortness of breath [Dyspnea on exertion] : not dyspnea during exertion [Chest Discomfort] : no chest discomfort [Palpitations] : no palpitations [Orthopnea] : no orthopnea [Syncope] : no syncope [Cough] : no cough [Wheezing] : no wheezing [Abdominal Pain] : no abdominal pain

## 2023-04-20 NOTE — CARDIOLOGY SUMMARY
[de-identified] : 12/21 afib with a ventricular rate of 92bpm\par 2/22/23 NSR 68bpm\par 4/19/23 sinus at 60bpm

## 2023-04-20 NOTE — HISTORY OF PRESENT ILLNESS
[FreeTextEntry1] : 57 year old male with HTN, HLD and atrial fibrillation, who presents for followup evaluation s/p AF ablation 2/3/23. \par \par Of note Mr. Tenorio is usually accompanied by his brother.  He speaks some english but his primary language is [place]. He states he was first diagnosed with atrial fibrillation about 2 months ago.  He has noted SOB for the past 6-7 months.  States his EKG prior was early 2022 and was in NSR per his recollection. \par \par \par Patient states that overall he feels marked improvement. No further episodes of palpitations, no syncope, chest pain, SOB at rest, orthopnea or edema. Patient reports no issues post ablation with access site or with swallowing. He is on flecainide and compliant with eliquis. Sleep study showed moderate sleep apnea and recommended treatment.  He notes some exertional fatigue.\par  \par \par \par TSH WNL\par exho 8/2022- WD 55%. mild MR. LA normal \par holter 8/2022 atrial fibrillation with rates  with avg \par stress EF 55% mild anteroseptal hypokinesis \par

## 2023-06-20 ENCOUNTER — APPOINTMENT (OUTPATIENT)
Dept: PULMONOLOGY | Facility: CLINIC | Age: 58
End: 2023-06-20
Payer: COMMERCIAL

## 2023-06-20 VITALS
BODY MASS INDEX: 35.31 KG/M2 | HEIGHT: 68 IN | OXYGEN SATURATION: 97 % | TEMPERATURE: 97.9 F | WEIGHT: 233 LBS | HEART RATE: 88 BPM | DIASTOLIC BLOOD PRESSURE: 82 MMHG | RESPIRATION RATE: 12 BRPM | SYSTOLIC BLOOD PRESSURE: 151 MMHG

## 2023-06-20 DIAGNOSIS — G47.33 OBSTRUCTIVE SLEEP APNEA (ADULT) (PEDIATRIC): ICD-10-CM

## 2023-06-20 PROCEDURE — 99203 OFFICE O/P NEW LOW 30 MIN: CPT

## 2023-06-21 PROBLEM — G47.33 OBSTRUCTIVE SLEEP APNEA: Status: ACTIVE | Noted: 2023-06-21

## 2023-06-21 NOTE — CONSULT LETTER
[FreeTextEntry3] : Rozina Mccoy MD\par \par [employer] [facility]\par Pulmonary, Critical Care, and Sleep Medicine\par

## 2023-06-21 NOTE — ASSESSMENT
[FreeTextEntry1] : REVIEWED\par HST 4/2023: AHI 20.5 4%; AHI 29.1 3%; T88 2%\par \par 59yo with Afib and moderate EMY. Will start with APAP, set 5 to 20 cm H2O with a home mask fitting. DME is [facility]. The benefits of EMY treatment in improving cardiac, neurologic, cognitive, and mortality outcomes were discussed. Adherence goal is at least 4 hours per night on 70% of nights with an AHI of less than 10 events per hour. The ramifications of EMY and its treatment were discussed in detail. Follow up within 6 weeks of use.\par

## 2023-06-21 NOTE — REVIEW OF SYSTEMS
[Difficulty Initiating Sleep] : no difficulty falling asleep [Lower Extremity Discomfort] : no lower extremity discomfort [Late day/ Evening symptoms] : no late day/evening symptoms

## 2023-06-21 NOTE — HISTORY OF PRESENT ILLNESS
[FreeTextEntry1] : 57 yo with Afib. S/p ablation in Feb 2023. Doing well after ablation. Had HST and diagnosed with EMY; here for management.  [ESS] : 9

## 2023-07-19 ENCOUNTER — APPOINTMENT (OUTPATIENT)
Dept: HEART AND VASCULAR | Facility: CLINIC | Age: 58
End: 2023-07-19
Payer: COMMERCIAL

## 2023-07-19 ENCOUNTER — NON-APPOINTMENT (OUTPATIENT)
Age: 58
End: 2023-07-19

## 2023-07-19 VITALS — DIASTOLIC BLOOD PRESSURE: 79 MMHG | HEART RATE: 73 BPM | SYSTOLIC BLOOD PRESSURE: 122 MMHG

## 2023-07-19 VITALS — WEIGHT: 232 LBS | HEIGHT: 68 IN | BODY MASS INDEX: 35.16 KG/M2 | TEMPERATURE: 97.3 F

## 2023-07-19 PROCEDURE — 99214 OFFICE O/P EST MOD 30 MIN: CPT | Mod: 25

## 2023-07-19 PROCEDURE — 93000 ELECTROCARDIOGRAM COMPLETE: CPT

## 2023-07-19 NOTE — PHYSICAL EXAM
[Well Developed] : well developed [Well Nourished] : well nourished [No Acute Distress] : no acute distress [Normal Conjunctiva] : normal conjunctiva [Normal Venous Pressure] : normal venous pressure [No Carotid Bruit] : no carotid bruit [No Murmur] : no murmur [No Rub] : no rub [No Gallop] : no gallop [5th Left ICS - MCL] : palpated at the 5th LICS in the midclavicular line [Normal Rate] : normal [Rhythm Regular] : regular [Clear Lung Fields] : clear lung fields [Good Air Entry] : good air entry [No Respiratory Distress] : no respiratory distress  [Normal Gait] : normal gait [No Edema] : no edema [No Rash] : no rash [Moves all extremities] : moves all extremities [Alert and Oriented] : alert and oriented

## 2023-07-25 NOTE — HISTORY OF PRESENT ILLNESS
[FreeTextEntry1] : 58 year old male with HTN, HLD and atrial fibrillation, who presents for followup evaluation s/p AF ablation 2/3/23. \par \par \par \par \par Patient states that overall he feels marked improvement since his ablation. No further episodes of palpitations, no syncope, chest pain, SOB at rest, orthopnea or edema.  He is eliquis. Sleep study showed moderate sleep apnea and recommended treatment. \par  \par \par \par TSH WNL\par exho 8/2022- WD 55%. mild MR. LA normal \par holter 8/2022 atrial fibrillation with rates  with avg \par stress EF 55% mild anteroseptal hypokinesis \par

## 2023-07-25 NOTE — REVIEW OF SYSTEMS
[Negative] : Heme/Lymph [Fever] : no fever [Chills] : no chills [Feeling Fatigued] : not feeling fatigued [Blurry Vision] : no blurred vision [SOB] : no shortness of breath [Dyspnea on exertion] : not dyspnea during exertion [Chest Discomfort] : no chest discomfort [Palpitations] : no palpitations [Orthopnea] : no orthopnea [Syncope] : no syncope [Cough] : no cough [Wheezing] : no wheezing [Abdominal Pain] : no abdominal pain

## 2023-07-25 NOTE — CARDIOLOGY SUMMARY
[de-identified] : 12/21 afib with a ventricular rate of 92bpm\par 2/22/23 NSR 68bpm\par 4/19/23 sinus at 60bpm\par 7/19/23 sinus at 71bpm

## 2023-07-25 NOTE — DISCUSSION/SUMMARY
[EKG obtained to assist in diagnosis and management of assessed problem(s)] : EKG obtained to assist in diagnosis and management of assessed problem(s) [FreeTextEntry1] : 57 year old male with HTN, HLD and atrial fibrillation, who presents for followup evaluation s/p AF ablation 2/3/23. \par \par #Atrial Fibrillation\par No further symptoms concerning for recurrent arrhythmia\par Patient is in NSR \par  \par #anticoagulation\par CHADS score is 1 (HTN) and he was symptomatic in afib.  We discussed options of continuing oral anticoagulation, stopping anticoagulation or long term monitoring with an ILR, routine event monitors or wearable technology.  For now we will stop oral anticoagulation and consider a repeat event monitor on his next visit. \par \par \par #EMY\par He was given the number to contact the sleep center to follow up regarding treatment.\par \par #Lifestyle modification\par Discussed lifestyle modifications including weight control, treatment of HTN, and limiting alcohol. \par \par Patient will return to office in 6 months, he knows to call with any questions or concerns.

## 2024-01-17 ENCOUNTER — APPOINTMENT (OUTPATIENT)
Dept: HEART AND VASCULAR | Facility: CLINIC | Age: 59
End: 2024-01-17

## 2025-01-31 NOTE — PHYSICAL EXAM
[Well Developed] : well developed [Well Nourished] : well nourished [No Acute Distress] : no acute distress [Normal Conjunctiva] : normal conjunctiva [Normal Venous Pressure] : normal venous pressure [No Carotid Bruit] : no carotid bruit [No Murmur] : no murmur [No Rub] : no rub [No Gallop] : no gallop [5th Left ICS - MCL] : palpated at the 5th LICS in the midclavicular line [Normal Rate] : normal [Clear Lung Fields] : clear lung fields [Good Air Entry] : good air entry [No Respiratory Distress] : no respiratory distress  [Soft] : abdomen soft [Normal Gait] : normal gait [No Edema] : no edema [No Rash] : no rash [Moves all extremities] : moves all extremities [No Focal Deficits] : no focal deficits [Alert and Oriented] : alert and oriented [Normal memory] : normal memory [Rhythm Regular] : regular Patient/Caregiver provided printed discharge information.